# Patient Record
Sex: FEMALE | Race: BLACK OR AFRICAN AMERICAN | Employment: FULL TIME | URBAN - METROPOLITAN AREA
[De-identification: names, ages, dates, MRNs, and addresses within clinical notes are randomized per-mention and may not be internally consistent; named-entity substitution may affect disease eponyms.]

---

## 2017-07-11 ENCOUNTER — HOSPITAL ENCOUNTER (OUTPATIENT)
Dept: PHYSICAL THERAPY | Age: 67
Discharge: HOME | End: 2017-07-11
Payer: COMMERCIAL

## 2017-07-11 PROCEDURE — G8979 MOBILITY GOAL STATUS: HCPCS

## 2017-07-11 PROCEDURE — 97162 PT EVAL MOD COMPLEX 30 MIN: CPT

## 2017-07-11 PROCEDURE — G8978 MOBILITY CURRENT STATUS: HCPCS

## 2017-07-11 NOTE — PROGRESS NOTES
Cheryl Daly  : [facility] [address] [place] [address]  [phone]   [phone]        OUTPATIENT PHYSICAL THERAPY:Initial Assessment 2017    ICD-10: Treatment Diagnosis: Lumbar Pain- M54.5  Precautions/Allergies:   Codine, Latex   Fall Risk Score: 1 (? 5 = High Risk)  MD Orders: Eval and Treat MEDICAL/REFERRING DIAGNOSIS:  Low back pain [M54.5]   DATE OF ONSET: 3 weeks ago  REFERRING PHYSICIAN: Adelaida Zaragoza MD  RETURN PHYSICIAN APPOINTMENT: Not Reported     INITIAL ASSESSMENT:  Ms. Guru Mckeon presents with lumbar pain with facet joint irritation and associated muscle guarding. PROBLEM LIST (Impacting functional limitations):  1. Decreased Strength  2. Decreased ADL/Functional Activities  3. Decreased Ambulation Ability/Technique  4. Increased Pain  5. Decreased Activity Tolerance  6. Decreased Flexibility/Joint Mobility INTERVENTIONS PLANNED:  1. Heat  2. Home Exercise Program (HEP)  3. Manual Therapy  4. Neuromuscular Re-education/Strengthening  5. Range of Motion (ROM)  6. Therapeutic Exercise/Strengthening   TREATMENT PLAN:  Effective Dates: 17 TO 17. Frequency/Duration: 2 times a week for 8 weeks  GOALS: (Goals have been discussed and agreed upon with patient.)  SHORT-TERM FUNCTIONAL GOALS: Time Frame: 2017  1. The patient will be independent with a basic home exercise program(HEP) to address pain, ROM, and muscle function  2. The patient will show a decrease in pain level to <6/10. DISCHARGE GOALS: Time Frame: 2017  1. The patient will be independent with an advanced home exercise program(HEP) to address pain, ROM, and muscle function  2. The patient will show improvement of AROM to within functional limits to improve ability to bend, lift, perform work duties, and sleep normally.   3. The patient will show a return of lumbopelvic muscle strength to >4+/5 and improvements in muscle coordination, control, and conditioning for return to activities of bending, lifting, sleeping, prolonged sitting, prolonged walking, housework, and negotiating stairs. 4. The patient will show a decrease in pain level to <3/10 to improve ability to return to activities of bending, lifting, sleeping, prolonged sitting, prolonged walking, housework, and negotiating stairs. 5. The patient will improve Oswestry score to <10/50 to reflect an improvement in this patients self reported functional ability. Rehabilitation Potential For Stated Goals: Good  Regarding Rafael Leahjosé luisbeth [address] Bal[facility]'s therapy, I certify that the treatment plan above will be carried out by a therapist or under their direction. Thank you for this referral,  Isra Godwin , DPT, OCS, FAAOMPT, CSCS   Referring Physician Signature: Gary Smith MD              Date                    The information in this section was collected on 7/11/17 (except where otherwise noted). HISTORY:   History of Present Injury/Illness (Reason for Referral): The patient comes to physical therapy clinic reporting lumbar pain on R side and R anterior thigh pain and numbness. Started 3 weeks ago for no known reason with no known injury or trauma. History of fall with lumbar spasms 1990's with full recovery and significant lumbar pain since then. [place] of symptoms reported in lumbar as occasionally stabbing and dull ache. Symptoms are reported as getting better over the last 2 weeks. Average pain level reported as 8/10 and worst pain a 10/10. Aggravating factors include prolonged standing/walking, lifting/bending, laying on your back, negotiating stairs. Relieving factors include rest, sitting in a certain position, TENS, heat/ice. Imaging performed includes none at this point. Past Medical History/Comorbidities:   Ms. Gifty Moore  has HTN and patellar fracture with repair a few years ago, past cancer medical history on file.   Social History/Living Environment: Lives is a 2 story home with bed room upstairs  Prior Level of Function/Work/Activity:  Works as a  since [address],[facility] and has a mix of sitting and walking; wants to get back to dancing, bowling, shopping  Dominant Side:         RIGHT  Current Medications:     No current outpatient prescriptions on file. Date Last Reviewed:  7/11/2017    Number of Personal Factors/Comorbidities that affect the Plan of Care: 1-2: MODERATE COMPLEXITY   EXAMINATION:   Patient denies any increase of symptoms with cough, sneeze, or valsalva maneuver. Patient denies any saddle paresthesia or changes in bowel/bladder function. .............   Observation/Orthostatic:   In Standing    Thoracic Spine Slight kyphosis   Lumbar Spine Increased lordosis   Lumbar Soft Tissues Tight and guarded R   LE Positioning Decreased weight bearing on R LE   Gait Observation Guarded and slow gait   Palpation: tender, firm, guarded R paraspinals and quadratus lumborum   Lumbar Segmental Joint Mobility: unable to accurately test due to guarding  Range of Motion:    Lumbar AROM   Flexion 80% pulling lumbar   Extension 20% pain inc   R Rotation 60%   L Rotation 60%   R Sidebending 70%   L Sidebending 70%      Strength:   Lumbar    Flexion 4-/5   Extension 4   R Rotation 4-   L Rotation 4-   R Sidebending 4-   L Sidebending 4-   Lumbar Multifidus Activation    Transverse Abdominis(TrA) Activation       Special Tests:     Lumbar    Passive Straight Leg Raise (SLR) Negative for signs of HNP   Crossed SLR Negative     Neurologic Screen:    Myotomes Right  Left   Hip Flexion (L1-2) Normal Normal   Knee Extension (L3) Normal Normal   Ankle Dorsiflexion (L4) Normal Normal   Ankle Eversion or Great Toe Extension (L5) Normal Normal   Knee Flexion or Ankle Plantarflexion (S1) Normal Normal     Dermatomes Right  Left    Mid Thigh L1-2 Dull Normal   Medial Knee/Patella L3 Normal Normal   Medial Lower Leg/Foot L4 Normal Normal   Lateral Lower Leg/Foot L5 Normal Normal   Posterior Lower Leg S1 Normal Normal     Reflexes Right  Left   Patellar (L4) 2+ 2+   Achilles Tendon (S1-2) 2+ 2+     Neural Tension Tests Right  Left   Passive Straight Leg Raise (SLR) Abnormal neural tension Mild abnormal neural tension   Crossed SLR Negative Negative        Body Structures Involved:  1. Bones  2. Joints  3. Muscles  4. Ligaments Body Functions Affected:  1. Neuromusculoskeletal  2. Movement Related Activities and Participation Affected:  1. General Tasks and Demands  2. Mobility  3. Self Care  4. Domestic Life  5. Community, Social and [place]   Number of elements (examined above) that affect the Plan of Care: 4+: HIGH COMPLEXITY   CLINICAL PRESENTATION:   Presentation: Evolving clinical presentation with changing clinical characteristics: MODERATE COMPLEXITY   CLINICAL DECISION MAKING:   Outcome Measure: Tool Used: Modified Oswestry Low Back Pain Questionnaire  Score:  Initial: 31/50  Most Recent: X/50 (Date: -- )   Interpretation of Score: Each section is scored on a 0-5 scale, 5 representing the greatest disability. The scores of each section are added together for a total score of 50. Score 0 1-10 11-20 21-30 31-40 41-49 50   Modifier CH CI CJ CK CL CM CN     ? Mobility - Walking and Moving Around:     - CURRENT STATUS: CL - 60%-79% impaired, limited or restricted    - GOAL STATUS: CI - 1%-19% impaired, limited or restricted    - D/C STATUS:  ---------------To be determined---------------      Medical Necessity:   · Patient demonstrates good rehab potential due to higher previous functional level. Reason for Services/Other Comments:  · Patient continues to require skilled intervention due to continued functional limitations due to continued impairments.    Use of outcome tool(s) and clinical judgement create a POC that gives a: Questionable prediction of patient's progress: MODERATE COMPLEXITY            TREATMENT:   (In addition to Assessment/Re-Assessment sessions the following treatments were rendered)  Pre-treatment Symptoms/Complaints:  Dull lumbar pain  Pain: Initial: Pain Intensity 1: 8/10 Post Session:  7/10     Therapeutic Exercise: ( ):  Exercises per grid below to improve mobility, strength and coordination. Required minimal visual and verbal cues to promote proper body posture and promote proper body mechanics. Progressed exercises as indicated. **Allergic to Latex** Date:  7/11/17   Activity/Exercise Parameters   Lower Trunk Rotation (LTR) 3min HEP   Posterior Pelvic Tilt (PPT) 3min HEP                          Manual Therapy (     ): Manual techniques to facilitate improved motion and decreased pain. · None today      Treatment/Session Assessment:    · Response to Treatment:  No irritation of any symptoms. · Compliance with Program/Exercises: Will assess as treatment progresses. · Recommendations/Intent for next treatment session: \"Next visit will focus on advancements to more challenging activities\".   Total Treatment Duration:  PT Patient Time In/Time Out  Time In: 1630  Time Out: [place] , DPT, OCS, FAAOMPT, CSCS

## 2017-07-12 NOTE — PROGRESS NOTES
Ambulatory/Rehab Services H2 Model Falls Risk Assessment    Risk Factor Pts. ·   Confusion/Disorientation/Impulsivity  []    4 ·   Symptomatic Depression  []   2 ·   Altered Elimination  []   1 ·   Dizziness/Vertigo  []   1 ·   Gender (Male)  []   1 ·   Any administered antiepileptics (anticonvulsants):  []   2 ·   Any administered benzodiazepines:  []   1 ·   Visual Impairment (specify):  []   1 ·   Portable Oxygen Use  []   1 ·   Orthostatic ? BP  []   1 ·   History of Recent Falls (within 3 mos.)  []   5     Ability to Rise from Chair (choose one) Pts. ·   Ability to rise in a single movement  []   0 ·   Pushes up, successful in one attempt  [x]   1 ·   Multiple attempts, but successful  []   3 ·   Unable to rise without assistance  []   4   Total: (5 or greater = High Risk) 1     Falls Prevention Plan:   []                Physical Limitations to Exercise (specify):   []                Mobility Assistance Device (type):   []                Exercise/Equipment Adaptation (specify):    ©2010 [facility] [address] Patent #[id number].  Federal Law prohibits the replication, distribution or use without written permission from [facility] [employer]

## 2017-07-18 ENCOUNTER — HOSPITAL ENCOUNTER (OUTPATIENT)
Dept: PHYSICAL THERAPY | Age: 67
Discharge: HOME | End: 2017-07-18
Payer: COMMERCIAL

## 2017-07-18 PROCEDURE — 97140 MANUAL THERAPY 1/> REGIONS: CPT

## 2017-07-18 PROCEDURE — 97110 THERAPEUTIC EXERCISES: CPT

## 2017-07-18 NOTE — PROGRESS NOTES
Ирина Michel  : 1950 [facility] at [address] [place] [address]  [phone]   [phone]        OUTPATIENT PHYSICAL THERAPY:Daily Note 2017    ICD-10: Treatment Diagnosis: Lumbar Pain- M54.5  Precautions/Allergies:   Codine, Latex   Fall Risk Score: 1 (? 5 = High Risk)  MD Orders: Eval and Treat MEDICAL/REFERRING DIAGNOSIS:  Low back pain [M54.5]   DATE OF ONSET: 3 weeks ago  REFERRING PHYSICIAN: Alexandru Mcwilliams MD  RETURN PHYSICIAN APPOINTMENT: Not Reported     INITIAL ASSESSMENT:  Ms. Valente Dc presents with lumbar pain with facet joint irritation and associated muscle guarding. PROBLEM LIST (Impacting functional limitations):  1. Decreased Strength  2. Decreased ADL/Functional Activities  3. Decreased Ambulation Ability/Technique  4. Increased Pain  5. Decreased Activity Tolerance  6. Decreased Flexibility/Joint Mobility INTERVENTIONS PLANNED:  1. Heat  2. Home Exercise Program (HEP)  3. Manual Therapy  4. Neuromuscular Re-education/Strengthening  5. Range of Motion (ROM)  6. Therapeutic Exercise/Strengthening   TREATMENT PLAN:  Effective Dates: 17 TO 17. Frequency/Duration: 2 times a week for 8 weeks  GOALS: (Goals have been discussed and agreed upon with patient.)  SHORT-TERM FUNCTIONAL GOALS: Time Frame: 2017  1. The patient will be independent with a basic home exercise program(HEP) to address pain, ROM, and muscle function  2. The patient will show a decrease in pain level to <6/10. DISCHARGE GOALS: Time Frame: 2017  1. The patient will be independent with an advanced home exercise program(HEP) to address pain, ROM, and muscle function  2. The patient will show improvement of AROM to within functional limits to improve ability to bend, lift, perform work duties, and sleep normally.   3. The patient will show a return of lumbopelvic muscle strength to >4+/5 and improvements in muscle coordination, control, and conditioning for return to activities of bending, lifting, sleeping, prolonged sitting, prolonged walking, housework, and negotiating stairs. 4. The patient will show a decrease in pain level to <3/10 to improve ability to return to activities of bending, lifting, sleeping, prolonged sitting, prolonged walking, housework, and negotiating stairs. 5. The patient will improve Oswestry score to <10/50 to reflect an improvement in this patients self reported functional ability. Rehabilitation Potential For Stated Goals: Good  Regarding Rafael Leahjosé luisbeth [address] Bal[facility]'s therapy, I certify that the treatment plan above will be carried out by a therapist or under their direction. Thank you for this referral,  Matty Trimble , DPT, OCS, FAAOMPT, CSCS   Referring Physician Signature: Monico Feldman MD              Date                    The information in this section was collected on 7/11/17 (except where otherwise noted). HISTORY:   History of Present Injury/Illness (Reason for Referral): The patient comes to physical therapy clinic reporting lumbar pain on R side and R anterior thigh pain and numbness. Started 3 weeks ago for no known reason with no known injury or trauma. History of fall with lumbar spasms 1990's with full recovery and significant lumbar pain since then. [place] of symptoms reported in lumbar as occasionally stabbing and dull ache. Symptoms are reported as getting better over the last 2 weeks. Average pain level reported as 8/10 and worst pain a 10/10. Aggravating factors include prolonged standing/walking, lifting/bending, laying on your back, negotiating stairs. Relieving factors include rest, sitting in a certain position, TENS, heat/ice. Imaging performed includes none at this point. Past Medical History/Comorbidities:   Ms. Shikha Sutherland  has HTN and patellar fracture with repair a few years ago, past cancer medical history on file.   Social History/Living Environment:     Lives is a 2 story home with bed room upstairs  Prior Level of Function/Work/Activity:  Works as a  since [address],[facility] and has a mix of sitting and walking; wants to get back to dancing, bowling, shopping  Dominant Side:         RIGHT  Current Medications:     No current outpatient prescriptions on file. Date Last Reviewed:  7/18/2017    Number of Personal Factors/Comorbidities that affect the Plan of Care: 1-2: MODERATE COMPLEXITY   EXAMINATION:   Patient denies any increase of symptoms with cough, sneeze, or valsalva maneuver. Patient denies any saddle paresthesia or changes in bowel/bladder function. .............   Observation/Orthostatic:   In Standing    Thoracic Spine Slight kyphosis   Lumbar Spine Increased lordosis   Lumbar Soft Tissues Tight and guarded R   LE Positioning Decreased weight bearing on R LE   Gait Observation Guarded and slow gait   Palpation: tender, firm, guarded R paraspinals and quadratus lumborum   Lumbar Segmental Joint Mobility: unable to accurately test due to guarding  Range of Motion:    Lumbar AROM   Flexion 80% pulling lumbar   Extension 20% pain inc   R Rotation 60%   L Rotation 60%   R Sidebending 70%   L Sidebending 70%      Strength:   Lumbar    Flexion 4-/5   Extension 4   R Rotation 4-   L Rotation 4-   R Sidebending 4-   L Sidebending 4-   Lumbar Multifidus Activation    Transverse Abdominis(TrA) Activation       Special Tests:     Lumbar    Passive Straight Leg Raise (SLR) Negative for signs of HNP   Crossed SLR Negative     Neurologic Screen:    Myotomes Right  Left   Hip Flexion (L1-2) Normal Normal   Knee Extension (L3) Normal Normal   Ankle Dorsiflexion (L4) Normal Normal   Ankle Eversion or Great Toe Extension (L5) Normal Normal   Knee Flexion or Ankle Plantarflexion (S1) Normal Normal     Dermatomes Right  Left    Mid Thigh L1-2 Dull Normal   Medial Knee/Patella L3 Normal Normal   Medial Lower Leg/Foot L4 Normal Normal   Lateral Lower Leg/Foot L5 Normal Normal Posterior Lower Leg S1 Normal Normal     Reflexes Right  Left   Patellar (L4) 2+ 2+   Achilles Tendon (S1-2) 2+ 2+     Neural Tension Tests Right  Left   Passive Straight Leg Raise (SLR) Abnormal neural tension Mild abnormal neural tension   Crossed SLR Negative Negative        Body Structures Involved:  1. Bones  2. Joints  3. Muscles  4. Ligaments Body Functions Affected:  1. Neuromusculoskeletal  2. Movement Related Activities and Participation Affected:  1. General Tasks and Demands  2. Mobility  3. Self Care  4. Domestic Life  5. Community, Social and [employer]   Number of elements (examined above) that affect the Plan of Care: 4+: HIGH COMPLEXITY   CLINICAL PRESENTATION:   Presentation: Evolving clinical presentation with changing clinical characteristics: MODERATE COMPLEXITY   CLINICAL DECISION MAKING:   Outcome Measure: Tool Used: Modified Oswestry Low Back Pain Questionnaire  Score:  Initial: 31/50  Most Recent: X/50 (Date: -- )   Interpretation of Score: Each section is scored on a 0-5 scale, 5 representing the greatest disability. The scores of each section are added together for a total score of 50. Score 0 1-10 11-20 21-30 31-40 41-49 50   Modifier CH CI CJ CK CL CM CN     ? Mobility - Walking and Moving Around:     - CURRENT STATUS: CL - 60%-79% impaired, limited or restricted    - GOAL STATUS: CI - 1%-19% impaired, limited or restricted    - D/C STATUS:  ---------------To be determined---------------    Medical Necessity:   · Patient demonstrates good rehab potential due to higher previous functional level. Reason for Services/Other Comments:  · Patient continues to require skilled intervention due to continued functional limitations due to continued impairments.    Use of outcome tool(s) and clinical judgement create a POC that gives a: Questionable prediction of patient's progress: MODERATE COMPLEXITY            TREATMENT:   (In addition to Assessment/Re-Assessment sessions the following treatments were rendered)  Pre-treatment Symptoms/Complaints:  (7/18/2017)  Pt reports she feels like her pain has been better since last time. Pain: Initial: Pain Intensity 1: 6/10 Post Session:  5/10     Therapeutic Exercise: (30 Minutes):  Exercises per grid below to improve mobility, strength and coordination. Required minimal visual and verbal cues to promote proper body posture and promote proper body mechanics. Progressed exercises as indicated. **Allergic to Latex** Date:  7/18/2017   Activity/Exercise Parameters   Lower Trunk Rotation (LTR) 3min HEP   Posterior Pelvic Tilt (PPT) 3min HEP   Sidelying Thoracic/Lumbar Rotation 3x20   Supine BKTC with swiss ball 2x20                  Manual Therapy (    Soft Tissue Mobilization Duration  Duration: 15 Minutes): Manual techniques to facilitate improved motion and decreased pain. · Soft Tissue Mobilization (STM)- R paraspinals and R quadratus lumborum  · Supine Hip Axial Distraction Oscillations for lumbar muscle relaxation    Treatment/Session Assessment:  (7/18/2017)  Pt shows significant progress with reduction in lumbar muscle guarding and tension. · Response to Treatment:  No irritation of any symptoms. · Compliance with Program/Exercises: Will assess as treatment progresses. · Recommendations/Intent for next treatment session: \"Next visit will focus on advancements to more challenging activities\".   Total Treatment Duration:  PT Patient Time In/Time Out  Time In: 1630  Time Out: [place] , DPT, OCS, FAAOMPT, CSCS

## 2017-08-01 ENCOUNTER — APPOINTMENT (OUTPATIENT)
Dept: PHYSICAL THERAPY | Age: 67
End: 2017-08-01
Payer: COMMERCIAL

## 2017-08-01 ENCOUNTER — HOSPITAL ENCOUNTER (OUTPATIENT)
Dept: PHYSICAL THERAPY | Age: 67
Discharge: HOME | End: 2017-08-01
Payer: COMMERCIAL

## 2017-08-01 PROCEDURE — 97140 MANUAL THERAPY 1/> REGIONS: CPT

## 2017-08-01 PROCEDURE — 97110 THERAPEUTIC EXERCISES: CPT

## 2017-08-03 ENCOUNTER — HOSPITAL ENCOUNTER (OUTPATIENT)
Dept: PHYSICAL THERAPY | Age: 67
End: 2017-08-03
Payer: COMMERCIAL

## 2017-08-03 ENCOUNTER — APPOINTMENT (OUTPATIENT)
Dept: PHYSICAL THERAPY | Age: 67
End: 2017-08-03
Payer: COMMERCIAL

## 2017-08-08 ENCOUNTER — HOSPITAL ENCOUNTER (OUTPATIENT)
Dept: PHYSICAL THERAPY | Age: 67
Discharge: HOME | End: 2017-08-08
Payer: COMMERCIAL

## 2017-08-08 ENCOUNTER — APPOINTMENT (OUTPATIENT)
Dept: PHYSICAL THERAPY | Age: 67
End: 2017-08-08
Payer: COMMERCIAL

## 2017-08-08 PROCEDURE — 97140 MANUAL THERAPY 1/> REGIONS: CPT

## 2017-08-08 PROCEDURE — 97110 THERAPEUTIC EXERCISES: CPT

## 2017-08-08 NOTE — PROGRESS NOTES
Jessica Macias  : [facility] at [address] [place] [address]  [phone]   [phone]        OUTPATIENT PHYSICAL THERAPY:Daily Note 2017    ICD-10: Treatment Diagnosis: Lumbar Pain- M54.5  Precautions/Allergies:   Codine, Latex   Fall Risk Score: 1 (? 5 = High Risk)  MD Orders: Eval and Treat MEDICAL/REFERRING DIAGNOSIS:  Low back pain [M54.5]   DATE OF ONSET: 3 weeks ago  REFERRING PHYSICIAN: Giancarlo Prado MD  RETURN PHYSICIAN APPOINTMENT: Not Reported     INITIAL ASSESSMENT:  Ms. Victorino Sherman presents with lumbar pain with facet joint irritation and associated muscle guarding. PROBLEM LIST (Impacting functional limitations):  1. Decreased Strength  2. Decreased ADL/Functional Activities  3. Decreased Ambulation Ability/Technique  4. Increased Pain  5. Decreased Activity Tolerance  6. Decreased Flexibility/Joint Mobility INTERVENTIONS PLANNED:  1. Heat  2. Home Exercise Program (HEP)  3. Manual Therapy  4. Neuromuscular Re-education/Strengthening  5. Range of Motion (ROM)  6. Therapeutic Exercise/Strengthening   TREATMENT PLAN:  Effective Dates: 17 TO 17. Frequency/Duration: 2 times a week for 8 weeks  GOALS: (Goals have been discussed and agreed upon with patient.)  SHORT-TERM FUNCTIONAL GOALS: Time Frame: 2017  1. The patient will be independent with a basic home exercise program(HEP) to address pain, ROM, and muscle function  2. The patient will show a decrease in pain level to <6/10. DISCHARGE GOALS: Time Frame: 2017  1. The patient will be independent with an advanced home exercise program(HEP) to address pain, ROM, and muscle function  2. The patient will show improvement of AROM to within functional limits to improve ability to bend, lift, perform work duties, and sleep normally.   3. The patient will show a return of lumbopelvic muscle strength to >4+/5 and improvements in muscle coordination, control, and conditioning for return to activities of bending, lifting, sleeping, prolonged sitting, prolonged walking, housework, and negotiating stairs. 4. The patient will show a decrease in pain level to <3/10 to improve ability to return to activities of bending, lifting, sleeping, prolonged sitting, prolonged walking, housework, and negotiating stairs. 5. The patient will improve Oswestry score to <10/50 to reflect an improvement in this patients self reported functional ability. Rehabilitation Potential For Stated Goals: Good              The information in this section was collected on 7/11/17 (except where otherwise noted). HISTORY:   History of Present Injury/Illness (Reason for Referral): The patient comes to physical therapy clinic reporting lumbar pain on R side and R anterior thigh pain and numbness. Started 3 weeks ago for no known reason with no known injury or trauma. History of fall with lumbar spasms 1990's with full recovery and significant lumbar pain since then. Syed Gills of symptoms reported in lumbar as occasionally stabbing and dull ache. Symptoms are reported as getting better over the last 2 weeks. Average pain level reported as 8/10 and worst pain a 10/10. Aggravating factors include prolonged standing/walking, lifting/bending, laying on your back, negotiating stairs. Relieving factors include rest, sitting in a certain position, TENS, heat/ice. Imaging performed includes none at this point. Past Medical History/Comorbidities:   Ms. Lucien Lundberg  has HTN and patellar fracture with repair a few years ago, past cancer medical history on file.   Social History/Living Environment:     Lives is a 2 story home with bed room upstairs  Prior Level of Function/Work/Activity:  Works as a  since 17 and has a mix of sitting and walking; wants to get back to dancing, bowling, shopping  Dominant Side:         RIGHT  Current Medications:     No current outpatient prescriptions on file. Date Last Reviewed:  8/8/2017    Number of Personal Factors/Comorbidities that affect the Plan of Care: 1-2: MODERATE COMPLEXITY   EXAMINATION:   Patient denies any increase of symptoms with cough, sneeze, or valsalva maneuver. Patient denies any saddle paresthesia or changes in bowel/bladder function. .............   Observation/Orthostatic:   In Standing    Thoracic Spine Slight kyphosis   Lumbar Spine Increased lordosis   Lumbar Soft Tissues Tight and guarded R   LE Positioning Decreased weight bearing on R LE   Gait Observation Guarded and slow gait   Palpation: tender, firm, guarded R paraspinals and quadratus lumborum   Lumbar Segmental Joint Mobility: unable to accurately test due to guarding  Range of Motion:    Lumbar AROM   Flexion 80% pulling lumbar   Extension 20% pain inc   R Rotation 60%   L Rotation 60%   R Sidebending 70%   L Sidebending 70%      Strength:   Lumbar    Flexion 4-/5   Extension 4   R Rotation 4-   L Rotation 4-   R Sidebending 4-   L Sidebending 4-   Lumbar Multifidus Activation    Transverse Abdominis(TrA) Activation       Special Tests:     Lumbar    Passive Straight Leg Raise (SLR) Negative for signs of HNP   Crossed SLR Negative     Neurologic Screen:    Myotomes Right  Left   Hip Flexion (L1-2) Normal Normal   Knee Extension (L3) Normal Normal   Ankle Dorsiflexion (L4) Normal Normal   Ankle Eversion or Great Toe Extension (L5) Normal Normal   Knee Flexion or Ankle Plantarflexion (S1) Normal Normal     Dermatomes Right  Left    Mid Thigh L1-2 Dull Normal   Medial Knee/Patella L3 Normal Normal   Medial Lower Leg/Foot L4 Normal Normal   Lateral Lower Leg/Foot L5 Normal Normal   Posterior Lower Leg S1 Normal Normal     Reflexes Right  Left   Patellar (L4) 2+ 2+   Achilles Tendon (S1-2) 2+ 2+     Neural Tension Tests Right  Left   Passive Straight Leg Raise (SLR) Abnormal neural tension Mild abnormal neural tension   Crossed SLR Negative Negative        Body Structures Involved:  1. Bones  2. Joints  3. Muscles  4. Ligaments Body Functions Affected:  1. Neuromusculoskeletal  2. Movement Related Activities and Participation Affected:  1. General Tasks and Demands  2. Mobility  3. Self Care  4. Domestic Life  5. Community, Social and [place]   Number of elements (examined above) that affect the Plan of Care: 4+: HIGH COMPLEXITY   CLINICAL PRESENTATION:   Presentation: Evolving clinical presentation with changing clinical characteristics: MODERATE COMPLEXITY   CLINICAL DECISION MAKING:   Outcome Measure: Tool Used: Modified Oswestry Low Back Pain Questionnaire  Score:  Initial: 31/50  Most Recent: X/50 (Date: -- )   Interpretation of Score: Each section is scored on a 0-5 scale, 5 representing the greatest disability. The scores of each section are added together for a total score of 50. Score 0 1-10 11-20 21-30 31-40 41-49 50   Modifier CH CI CJ CK CL CM CN     ? Mobility - Walking and Moving Around:     - CURRENT STATUS: CL - 60%-79% impaired, limited or restricted    - GOAL STATUS: CI - 1%-19% impaired, limited or restricted    - D/C STATUS:  ---------------To be determined---------------    Medical Necessity:   · Patient demonstrates good rehab potential due to higher previous functional level. Reason for Services/Other Comments:  · Patient continues to require skilled intervention due to continued functional limitations due to continued impairments. Use of outcome tool(s) and clinical judgement create a POC that gives a: Questionable prediction of patient's progress: MODERATE COMPLEXITY            TREATMENT:   (In addition to Assessment/Re-Assessment sessions the following treatments were rendered)  Pre-treatment Symptoms/Complaints:  Patient says back is less sore today but feels stiff along her right side.      Pain: Initial: Pain Intensity 1: 3/10 Post Session: 1/10     Therapeutic Exercise: (30 Minutes):  Exercises per grid below to improve mobility, strength and coordination. Required minimal visual and verbal cues to promote proper body posture and promote proper body mechanics. Progressed exercises as indicated. **Allergic to Latex** Date:  8/1/17 Date:  8/8/17   Activity/Exercise Parameters Parameters   Lower Trunk Rotation (LTR) 3 min 2 min   Posterior Pelvic Tilt (PPT) 3 min 2min   Sidelying Thoracic/Lumbar Rotation 3 x 10 2 x 10   Supine BKTC with swiss ball -- --   Hip isometrics Belt, ball, 3 x 10 each Red, x 2 min, pillow x 2 min   Glut set 10s x 10 each x10   KTC -- SL and DL x 5 each   Self distraction -- Gray band x 5 min      Manual Therapy (    Soft Tissue Mobilization Duration  Duration: 15 Minutes): Manual techniques to facilitate improved motion and decreased pain. · Soft Tissue Mobilization (STM)- R paraspinals and R quadratus lumborum  · Supine Hip Axial Distraction Oscillations for lumbar muscle relaxation    Treatment/Session Assessment:  Patient reports less stiffness and pain and improved ability to ambulate with less pain following long axis hip distraction today. · Response to Treatment:  No irritation of any symptoms. · Compliance with Program/Exercises: Will assess as treatment progresses. · Recommendations/Intent for next treatment session: \"Next visit will focus on advancements to more challenging activities\". Total Treatment Duration: 45 minutes  PT Patient Time In/Time Out  Time In: 1630  Time Out: Capri Schwartz

## 2017-08-10 ENCOUNTER — APPOINTMENT (OUTPATIENT)
Dept: PHYSICAL THERAPY | Age: 67
End: 2017-08-10
Payer: COMMERCIAL

## 2017-08-15 ENCOUNTER — APPOINTMENT (OUTPATIENT)
Dept: PHYSICAL THERAPY | Age: 67
End: 2017-08-15
Payer: COMMERCIAL

## 2017-08-15 ENCOUNTER — HOSPITAL ENCOUNTER (OUTPATIENT)
Dept: PHYSICAL THERAPY | Age: 67
Discharge: HOME | End: 2017-08-15
Payer: COMMERCIAL

## 2017-08-16 NOTE — PROGRESS NOTES
[facility] at [address] [place] [address], Oracio Beth   [phone]   [phone]    DATE: 8/15/2017    Patient cancelled appointment today due to not feeling well. Will plan to follow up on next scheduled visit.       Yoselyn Bhat, PT, DPT

## 2017-08-17 ENCOUNTER — APPOINTMENT (OUTPATIENT)
Dept: PHYSICAL THERAPY | Age: 67
End: 2017-08-17
Payer: COMMERCIAL

## 2017-08-17 ENCOUNTER — HOSPITAL ENCOUNTER (OUTPATIENT)
Dept: PHYSICAL THERAPY | Age: 67
Discharge: HOME | End: 2017-08-17
Payer: COMMERCIAL

## 2017-08-17 PROCEDURE — 97110 THERAPEUTIC EXERCISES: CPT

## 2017-08-17 NOTE — PROGRESS NOTES
Valente Crouch  : [facility] at [address] [place] [address]  [phone]   [phone]        OUTPATIENT PHYSICAL THERAPY:Daily Note 2017    ICD-10: Treatment Diagnosis: Lumbar Pain- M54.5  Precautions/Allergies:   Codine, Latex   Fall Risk Score: 1 (? 5 = High Risk)  MD Orders: Eval and Treat MEDICAL/REFERRING DIAGNOSIS:  Low back pain [M54.5]   DATE OF ONSET: 3 weeks ago  REFERRING PHYSICIAN: Luz Mcclain MD  RETURN PHYSICIAN APPOINTMENT: Not Reported     INITIAL ASSESSMENT:  Ms. Jaylene Lucas presents with lumbar pain with facet joint irritation and associated muscle guarding. PROBLEM LIST (Impacting functional limitations):  1. Decreased Strength  2. Decreased ADL/Functional Activities  3. Decreased Ambulation Ability/Technique  4. Increased Pain  5. Decreased Activity Tolerance  6. Decreased Flexibility/Joint Mobility INTERVENTIONS PLANNED:  1. Heat  2. Home Exercise Program (HEP)  3. Manual Therapy  4. Neuromuscular Re-education/Strengthening  5. Range of Motion (ROM)  6. Therapeutic Exercise/Strengthening   TREATMENT PLAN:  Effective Dates: 17 TO 17. Frequency/Duration: 2 times a week for 8 weeks  GOALS: (Goals have been discussed and agreed upon with patient.)  SHORT-TERM FUNCTIONAL GOALS: Time Frame: 2017  1. The patient will be independent with a basic home exercise program(HEP) to address pain, ROM, and muscle function  2. The patient will show a decrease in pain level to <6/10. DISCHARGE GOALS: Time Frame: 2017  1. The patient will be independent with an advanced home exercise program(HEP) to address pain, ROM, and muscle function  2. The patient will show improvement of AROM to within functional limits to improve ability to bend, lift, perform work duties, and sleep normally.   3. The patient will show a return of lumbopelvic muscle strength to >4+/5 and improvements in muscle coordination, control, and conditioning for return to activities of bending, lifting, sleeping, prolonged sitting, prolonged walking, housework, and negotiating stairs. 4. The patient will show a decrease in pain level to <3/10 to improve ability to return to activities of bending, lifting, sleeping, prolonged sitting, prolonged walking, housework, and negotiating stairs. 5. The patient will improve Oswestry score to <10/50 to reflect an improvement in this patients self reported functional ability. Rehabilitation Potential For Stated Goals: Good              The information in this section was collected on 7/11/17 (except where otherwise noted). HISTORY:   History of Present Injury/Illness (Reason for Referral): The patient comes to physical therapy clinic reporting lumbar pain on R side and R anterior thigh pain and numbness. Started 3 weeks ago for no known reason with no known injury or trauma. History of fall with lumbar spasms 1990's with full recovery and significant lumbar pain since then. Ede Becerril of symptoms reported in lumbar as occasionally stabbing and dull ache. Symptoms are reported as getting better over the last 2 weeks. Average pain level reported as 8/10 and worst pain a 10/10. Aggravating factors include prolonged standing/walking, lifting/bending, laying on your back, negotiating stairs. Relieving factors include rest, sitting in a certain position, TENS, heat/ice. Imaging performed includes none at this point. Past Medical History/Comorbidities:   Ms. Diana Vasques  has HTN and patellar fracture with repair a few years ago, past cancer medical history on file.   Social History/Living Environment:     Lives is a 2 story home with bed room upstairs  Prior Level of Function/Work/Activity:  Works as a  since [address] [place] [address] and has a mix of sitting and walking; wants to get back to dancing, bowling, shopping  Dominant Side:         RIGHT  Current Medications:     No current outpatient prescriptions on file. Date Last Reviewed:  8/17/2017    Number of Personal Factors/Comorbidities that affect the Plan of Care: 1-2: MODERATE COMPLEXITY   EXAMINATION:   Patient denies any increase of symptoms with cough, sneeze, or valsalva maneuver. Patient denies any saddle paresthesia or changes in bowel/bladder function. .............   Observation/Orthostatic:   In Standing    Thoracic Spine Slight kyphosis   Lumbar Spine Increased lordosis   Lumbar Soft Tissues Tight and guarded R   LE Positioning Decreased weight bearing on R LE   Gait Observation Guarded and slow gait   Palpation: tender, firm, guarded R paraspinals and quadratus lumborum   Lumbar Segmental Joint Mobility: unable to accurately test due to guarding  Range of Motion:    Lumbar AROM   Flexion 80% pulling lumbar   Extension 20% pain inc   R Rotation 60%   L Rotation 60%   R Sidebending 70%   L Sidebending 70%      Strength:   Lumbar    Flexion 4-/5   Extension 4   R Rotation 4-   L Rotation 4-   R Sidebending 4-   L Sidebending 4-   Lumbar Multifidus Activation    Transverse Abdominis(TrA) Activation       Special Tests:     Lumbar    Passive Straight Leg Raise (SLR) Negative for signs of HNP   Crossed SLR Negative     Neurologic Screen:    Myotomes Right  Left   Hip Flexion (L1-2) Normal Normal   Knee Extension (L3) Normal Normal   Ankle Dorsiflexion (L4) Normal Normal   Ankle Eversion or Great Toe Extension (L5) Normal Normal   Knee Flexion or Ankle Plantarflexion (S1) Normal Normal     Dermatomes Right  Left    Mid Thigh L1-2 Dull Normal   Medial Knee/Patella L3 Normal Normal   Medial Lower Leg/Foot L4 Normal Normal   Lateral Lower Leg/Foot L5 Normal Normal   Posterior Lower Leg S1 Normal Normal     Reflexes Right  Left   Patellar (L4) 2+ 2+   Achilles Tendon (S1-2) 2+ 2+     Neural Tension Tests Right  Left   Passive Straight Leg Raise (SLR) Abnormal neural tension Mild abnormal neural tension   Crossed SLR Negative Negative        Body Structures Involved:  1. Bones  2. Joints  3. Muscles  4. Ligaments Body Functions Affected:  1. Neuromusculoskeletal  2. Movement Related Activities and Participation Affected:  1. General Tasks and Demands  2. Mobility  3. Self Care  4. Domestic Life  5. Community, Social and [place]   Number of elements (examined above) that affect the Plan of Care: 4+: HIGH COMPLEXITY   CLINICAL PRESENTATION:   Presentation: Evolving clinical presentation with changing clinical characteristics: MODERATE COMPLEXITY   CLINICAL DECISION MAKING:   Outcome Measure: Tool Used: Modified Oswestry Low Back Pain Questionnaire  Score:  Initial: 31/50  Most Recent: X/50 (Date: -- )   Interpretation of Score: Each section is scored on a 0-5 scale, 5 representing the greatest disability. The scores of each section are added together for a total score of 50. Score 0 1-10 11-20 21-30 31-40 41-49 50   Modifier CH CI CJ CK CL CM CN     ? Mobility - Walking and Moving Around:     - CURRENT STATUS: CL - 60%-79% impaired, limited or restricted    - GOAL STATUS: CI - 1%-19% impaired, limited or restricted    - D/C STATUS:  ---------------To be determined---------------    Medical Necessity:   · Patient demonstrates good rehab potential due to higher previous functional level. Reason for Services/Other Comments:  · Patient continues to require skilled intervention due to continued functional limitations due to continued impairments. Use of outcome tool(s) and clinical judgement create a POC that gives a: Questionable prediction of patient's progress: MODERATE COMPLEXITY            TREATMENT:   (In addition to Assessment/Re-Assessment sessions the following treatments were rendered)  Pre-treatment Symptoms/Complaints:  Patient says she is tired today. Pain: Initial: Pain Intensity 1: 2/10 Post Session: 1/10     Therapeutic Exercise: (30 Minutes):  Exercises per grid below to improve mobility, strength and coordination.   Required minimal visual and verbal cues to promote proper body posture and promote proper body mechanics. Progressed exercises as indicated. **Allergic to Latex** Date:  8/8/17 Date:  8/17/17   Activity/Exercise Parameters Parameters   Lower Trunk Rotation (LTR) 2 min 2 min   Posterior Pelvic Tilt (PPT) 2min 2 min   Sidelying Thoracic/Lumbar Rotation 2 x 10 2 x 10   Supine BKTC with swiss ball -- --   Hip isometrics Red, x 2 min, pillow x 2 min --   Glut set x10 --   KTC SL and DL x 5 each X 10 each   Self distraction Gray band x 5 min --   Cat camel -- 2 x 10   Hand heel rocks -- x10   Child pose -- 30s   Cable step out -- 2 x 10      Manual Therapy (     ): Manual techniques to facilitate improved motion and decreased pain. · Soft Tissue Mobilization (STM)- R paraspinals and R quadratus lumborum  · Supine Hip Axial Distraction Oscillations for lumbar muscle relaxation    Treatment/Session Assessment:  Patient continues to report less low back pain with repeated motions and stretching. Shortened session due to patient having schedule conflict. · Response to Treatment:  No irritation of any symptoms. · Compliance with Program/Exercises: Will assess as treatment progresses. · Recommendations/Intent for next treatment session: \"Next visit will focus on advancements to more challenging activities\". Total Treatment Duration: 30 minutes  PT Patient Time In/Time Out  Time In: 1630  Time Out: [facility]

## 2017-08-22 ENCOUNTER — HOSPITAL ENCOUNTER (OUTPATIENT)
Dept: PHYSICAL THERAPY | Age: 67
Discharge: HOME | End: 2017-08-22
Payer: COMMERCIAL

## 2017-08-22 PROCEDURE — 97140 MANUAL THERAPY 1/> REGIONS: CPT

## 2017-08-22 PROCEDURE — 97110 THERAPEUTIC EXERCISES: CPT

## 2017-08-22 NOTE — PROGRESS NOTES
Jyothi Crespo  : [facility] [address] [place] [address]  [phone]   [phone]        OUTPATIENT PHYSICAL THERAPY:Daily Note 2017    ICD-10: Treatment Diagnosis: Lumbar Pain- M54.5  Precautions/Allergies:   Codine, Latex   Fall Risk Score: 1 (? 5 = High Risk)  MD Orders: Eval and Treat MEDICAL/REFERRING DIAGNOSIS:  Low back pain [M54.5]   DATE OF ONSET: 3 weeks ago  REFERRING PHYSICIAN: Kraig Hoang MD  RETURN PHYSICIAN APPOINTMENT: Not Reported     INITIAL ASSESSMENT:  Ms. Terrell Castelan presents with lumbar pain with facet joint irritation and associated muscle guarding. PROBLEM LIST (Impacting functional limitations):  1. Decreased Strength  2. Decreased ADL/Functional Activities  3. Decreased Ambulation Ability/Technique  4. Increased Pain  5. Decreased Activity Tolerance  6. Decreased Flexibility/Joint Mobility INTERVENTIONS PLANNED:  1. Heat  2. Home Exercise Program (HEP)  3. Manual Therapy  4. Neuromuscular Re-education/Strengthening  5. Range of Motion (ROM)  6. Therapeutic Exercise/Strengthening   TREATMENT PLAN:  Effective Dates: 17 TO 17. Frequency/Duration: 2 times a week for 8 weeks  GOALS: (Goals have been discussed and agreed upon with patient.)  SHORT-TERM FUNCTIONAL GOALS: Time Frame: 2017  1. The patient will be independent with a basic home exercise program(HEP) to address pain, ROM, and muscle function  2. The patient will show a decrease in pain level to <6/10. DISCHARGE GOALS: Time Frame: 2017  1. The patient will be independent with an advanced home exercise program(HEP) to address pain, ROM, and muscle function  2. The patient will show improvement of AROM to within functional limits to improve ability to bend, lift, perform work duties, and sleep normally.   3. The patient will show a return of lumbopelvic muscle strength to >4+/5 and improvements in muscle coordination, control, and conditioning for return to activities of bending, lifting, sleeping, prolonged sitting, prolonged walking, housework, and negotiating stairs. 4. The patient will show a decrease in pain level to <3/10 to improve ability to return to activities of bending, lifting, sleeping, prolonged sitting, prolonged walking, housework, and negotiating stairs. 5. The patient will improve Oswestry score to <10/50 to reflect an improvement in this patients self reported functional ability. Rehabilitation Potential For Stated Goals: Good              The information in this section was collected on 7/11/17 (except where otherwise noted). HISTORY:   History of Present Injury/Illness (Reason for Referral): The patient comes to physical therapy clinic reporting lumbar pain on R side and R anterior thigh pain and numbness. Started 3 weeks ago for no known reason with no known injury or trauma. History of fall with lumbar spasms 1990's with full recovery and significant lumbar pain since then. Mittie Eduar of symptoms reported in lumbar as occasionally stabbing and dull ache. Symptoms are reported as getting better over the last 2 weeks. Average pain level reported as 8/10 and worst pain a 10/10. Aggravating factors include prolonged standing/walking, lifting/bending, laying on your back, negotiating stairs. Relieving factors include rest, sitting in a certain position, TENS, heat/ice. Imaging performed includes none at this point. Past Medical History/Comorbidities:   Ms. Valente Dc  has HTN and patellar fracture with repair a few years ago, past cancer medical history on file.   Social History/Living Environment:     Lives is a 2 story home with bed room upstairs  Prior Level of Function/Work/Activity:  Works as a  since [address] [place] [address] and has a mix of sitting and walking; wants to get back to dancing, bowling, shopping  Dominant Side:         RIGHT  Current Medications:     No current outpatient prescriptions on file. Date Last Reviewed:  8/22/2017    Number of Personal Factors/Comorbidities that affect the Plan of Care: 1-2: MODERATE COMPLEXITY   EXAMINATION:   Patient denies any increase of symptoms with cough, sneeze, or valsalva maneuver. Patient denies any saddle paresthesia or changes in bowel/bladder function. .............   Observation/Orthostatic:   In Standing    Thoracic Spine Slight kyphosis   Lumbar Spine Increased lordosis   Lumbar Soft Tissues Tight and guarded R   LE Positioning Decreased weight bearing on R LE   Gait Observation Guarded and slow gait   Palpation: tender, firm, guarded R paraspinals and quadratus lumborum   Lumbar Segmental Joint Mobility: unable to accurately test due to guarding  Range of Motion:    Lumbar AROM   Flexion 80% pulling lumbar   Extension 20% pain inc   R Rotation 60%   L Rotation 60%   R Sidebending 70%   L Sidebending 70%      Strength:   Lumbar    Flexion 4-/5   Extension 4   R Rotation 4-   L Rotation 4-   R Sidebending 4-   L Sidebending 4-   Lumbar Multifidus Activation    Transverse Abdominis(TrA) Activation       Special Tests:     Lumbar    Passive Straight Leg Raise (SLR) Negative for signs of HNP   Crossed SLR Negative     Neurologic Screen:    Myotomes Right  Left   Hip Flexion (L1-2) Normal Normal   Knee Extension (L3) Normal Normal   Ankle Dorsiflexion (L4) Normal Normal   Ankle Eversion or Great Toe Extension (L5) Normal Normal   Knee Flexion or Ankle Plantarflexion (S1) Normal Normal     Dermatomes Right  Left    Mid Thigh L1-2 Dull Normal   Medial Knee/Patella L3 Normal Normal   Medial Lower Leg/Foot L4 Normal Normal   Lateral Lower Leg/Foot L5 Normal Normal   Posterior Lower Leg S1 Normal Normal     Reflexes Right  Left   Patellar (L4) 2+ 2+   Achilles Tendon (S1-2) 2+ 2+     Neural Tension Tests Right  Left   Passive Straight Leg Raise (SLR) Abnormal neural tension Mild abnormal neural tension   Crossed SLR Negative Negative        Body Structures Involved:  1. Bones  2. Joints  3. Muscles  4. Ligaments Body Functions Affected:  1. Neuromusculoskeletal  2. Movement Related Activities and Participation Affected:  1. General Tasks and Demands  2. Mobility  3. Self Care  4. Domestic Life  5. Community, Social and [place]   Number of elements (examined above) that affect the Plan of Care: 4+: HIGH COMPLEXITY   CLINICAL PRESENTATION:   Presentation: Evolving clinical presentation with changing clinical characteristics: MODERATE COMPLEXITY   CLINICAL DECISION MAKING:   Outcome Measure: Tool Used: Modified Oswestry Low Back Pain Questionnaire  Score:  Initial: 31/50  Most Recent: X/50 (Date: -- )   Interpretation of Score: Each section is scored on a 0-5 scale, 5 representing the greatest disability. The scores of each section are added together for a total score of 50. Score 0 1-10 11-20 21-30 31-40 41-49 50   Modifier CH CI CJ CK CL CM CN     ? Mobility - Walking and Moving Around:     - CURRENT STATUS: CL - 60%-79% impaired, limited or restricted    - GOAL STATUS: CI - 1%-19% impaired, limited or restricted    - D/C STATUS:  ---------------To be determined---------------    Medical Necessity:   · Patient demonstrates good rehab potential due to higher previous functional level. Reason for Services/Other Comments:  · Patient continues to require skilled intervention due to continued functional limitations due to continued impairments. Use of outcome tool(s) and clinical judgement create a POC that gives a: Questionable prediction of patient's progress: MODERATE COMPLEXITY            TREATMENT:   (In addition to Assessment/Re-Assessment sessions the following treatments were rendered)  Pre-treatment Symptoms/Complaints:  Patient says her back is very sore today after having to go up/down stairs multiple times at work.      Pain: Initial: Pain Intensity 1: 8/10 Post Session: 4/10     Therapeutic Exercise: (30 Minutes):  Exercises per grid below to improve mobility, strength and coordination. Required minimal visual and verbal cues to promote proper body posture and promote proper body mechanics. Progressed exercises as indicated. **Allergic to Latex** Date:  8/17/17 Date:  8/22/17   Activity/Exercise Parameters Parameters   Lower Trunk Rotation (LTR) 2 min 2 min   Posterior Pelvic Tilt (PPT) 2 min 2 min   Sidelying Thoracic/Lumbar Rotation 2 x 10 --   Supine BKTC with swiss ball -- 2 min   Hip isometrics -- --   Glut set -- 1 min   KTC X 10 each x10 each   Self distraction -- --   Cat camel 2 x 10 --   Hand heel rocks x10 x10   Child pose 30s --   Cable step out 2 x 10 --   Flexion laying -- With TENS x 5 min      Manual Therapy (    Soft Tissue Mobilization Duration  Duration: 15 Minutes): Manual techniques to facilitate improved motion and decreased pain. · Soft Tissue Mobilization (STM)- R paraspinals and R quadratus lumborum  · Supine Hip Axial Distraction Oscillations for lumbar muscle relaxation    Treatment/Session Assessment:  Patient irritability level much higher today due to increased activity level at work but has favorable response to prolonged flexion positioning with TENS use during today's session. · Response to Treatment:  No irritation of any symptoms. · Compliance with Program/Exercises: Will assess as treatment progresses. · Recommendations/Intent for next treatment session: \"Next visit will focus on advancements to more challenging activities\". Total Treatment Duration: 45 minutes  PT Patient Time In/Time Out  Time In: 1625  Time Out: [address]  [place]

## 2017-08-29 ENCOUNTER — HOSPITAL ENCOUNTER (OUTPATIENT)
Dept: PHYSICAL THERAPY | Age: 67
Discharge: HOME | End: 2017-08-29
Payer: COMMERCIAL

## 2017-08-29 PROCEDURE — 97110 THERAPEUTIC EXERCISES: CPT

## 2017-08-29 NOTE — PROGRESS NOTES
Jennifer Munroe  : [facility] [address] [place] [address]  [phone]   [phone]        OUTPATIENT PHYSICAL THERAPY:Daily Note 2017    ICD-10: Treatment Diagnosis: Lumbar Pain- M54.5  Precautions/Allergies:   Codine, Latex   Fall Risk Score: 1 (? 5 = High Risk)  MD Orders: Eval and Treat MEDICAL/REFERRING DIAGNOSIS:  Low back pain [M54.5]   DATE OF ONSET: 3 weeks ago  REFERRING PHYSICIAN: Shahriar Dunaway MD  RETURN PHYSICIAN APPOINTMENT: Not Reported     INITIAL ASSESSMENT:  Ms. Link Aldrich presents with lumbar pain with facet joint irritation and associated muscle guarding. PROBLEM LIST (Impacting functional limitations):  1. Decreased Strength  2. Decreased ADL/Functional Activities  3. Decreased Ambulation Ability/Technique  4. Increased Pain  5. Decreased Activity Tolerance  6. Decreased Flexibility/Joint Mobility INTERVENTIONS PLANNED:  1. Heat  2. Home Exercise Program (HEP)  3. Manual Therapy  4. Neuromuscular Re-education/Strengthening  5. Range of Motion (ROM)  6. Therapeutic Exercise/Strengthening   TREATMENT PLAN:  Effective Dates: 17 TO 17. Frequency/Duration: 2 times a week for 8 weeks  GOALS: (Goals have been discussed and agreed upon with patient.)  SHORT-TERM FUNCTIONAL GOALS: Time Frame: 2017  1. The patient will be independent with a basic home exercise program(HEP) to address pain, ROM, and muscle function  2. The patient will show a decrease in pain level to <6/10. DISCHARGE GOALS: Time Frame: 2017  1. The patient will be independent with an advanced home exercise program(HEP) to address pain, ROM, and muscle function  2. The patient will show improvement of AROM to within functional limits to improve ability to bend, lift, perform work duties, and sleep normally.   3. The patient will show a return of lumbopelvic muscle strength to >4+/5 and improvements in muscle coordination, control, and conditioning for return to activities of bending, lifting, sleeping, prolonged sitting, prolonged walking, housework, and negotiating stairs. 4. The patient will show a decrease in pain level to <3/10 to improve ability to return to activities of bending, lifting, sleeping, prolonged sitting, prolonged walking, housework, and negotiating stairs. 5. The patient will improve Oswestry score to <10/50 to reflect an improvement in this patients self reported functional ability. Rehabilitation Potential For Stated Goals: Good              The information in this section was collected on 7/11/17 (except where otherwise noted). HISTORY:   History of Present Injury/Illness (Reason for Referral): The patient comes to physical therapy clinic reporting lumbar pain on R side and R anterior thigh pain and numbness. Started 3 weeks ago for no known reason with no known injury or trauma. History of fall with lumbar spasms 1990's with full recovery and significant lumbar pain since then. Bridgette Mullet of symptoms reported in lumbar as occasionally stabbing and dull ache. Symptoms are reported as getting better over the last 2 weeks. Average pain level reported as 8/10 and worst pain a 10/10. Aggravating factors include prolonged standing/walking, lifting/bending, laying on your back, negotiating stairs. Relieving factors include rest, sitting in a certain position, TENS, heat/ice. Imaging performed includes none at this point. Past Medical History/Comorbidities:   Ms. Ranjeet Pacheco  has HTN and patellar fracture with repair a few years ago, past cancer medical history on file.   Social History/Living Environment:     Lives is a 2 story home with bed room upstairs  Prior Level of Function/Work/Activity:  Works as a  since [address] [place] [address] and has a mix of sitting and walking; wants to get back to dancing, bowling, shopping  Dominant Side:         RIGHT  Current Medications:     No current outpatient prescriptions on file. Date Last Reviewed:  8/29/2017    Number of Personal Factors/Comorbidities that affect the Plan of Care: 1-2: MODERATE COMPLEXITY   EXAMINATION:   Patient denies any increase of symptoms with cough, sneeze, or valsalva maneuver. Patient denies any saddle paresthesia or changes in bowel/bladder function. .............   Observation/Orthostatic:   In Standing    Thoracic Spine Slight kyphosis   Lumbar Spine Increased lordosis   Lumbar Soft Tissues Tight and guarded R   LE Positioning Decreased weight bearing on R LE   Gait Observation Guarded and slow gait   Palpation: tender, firm, guarded R paraspinals and quadratus lumborum   Lumbar Segmental Joint Mobility: unable to accurately test due to guarding  Range of Motion:    Lumbar AROM   Flexion 80% pulling lumbar   Extension 20% pain inc   R Rotation 60%   L Rotation 60%   R Sidebending 70%   L Sidebending 70%      Strength:   Lumbar    Flexion 4-/5   Extension 4   R Rotation 4-   L Rotation 4-   R Sidebending 4-   L Sidebending 4-   Lumbar Multifidus Activation    Transverse Abdominis(TrA) Activation       Special Tests:     Lumbar    Passive Straight Leg Raise (SLR) Negative for signs of HNP   Crossed SLR Negative     Neurologic Screen:    Myotomes Right  Left   Hip Flexion (L1-2) Normal Normal   Knee Extension (L3) Normal Normal   Ankle Dorsiflexion (L4) Normal Normal   Ankle Eversion or Great Toe Extension (L5) Normal Normal   Knee Flexion or Ankle Plantarflexion (S1) Normal Normal     Dermatomes Right  Left    Mid Thigh L1-2 Dull Normal   Medial Knee/Patella L3 Normal Normal   Medial Lower Leg/Foot L4 Normal Normal   Lateral Lower Leg/Foot L5 Normal Normal   Posterior Lower Leg S1 Normal Normal     Reflexes Right  Left   Patellar (L4) 2+ 2+   Achilles Tendon (S1-2) 2+ 2+     Neural Tension Tests Right  Left   Passive Straight Leg Raise (SLR) Abnormal neural tension Mild abnormal neural tension   Crossed SLR Negative Negative        Body Structures Involved:  1. Bones  2. Joints  3. Muscles  4. Ligaments Body Functions Affected:  1. Neuromusculoskeletal  2. Movement Related Activities and Participation Affected:  1. General Tasks and Demands  2. Mobility  3. Self Care  4. Domestic Life  5. Community, Social and [place]   Number of elements (examined above) that affect the Plan of Care: 4+: HIGH COMPLEXITY   CLINICAL PRESENTATION:   Presentation: Evolving clinical presentation with changing clinical characteristics: MODERATE COMPLEXITY   CLINICAL DECISION MAKING:   Outcome Measure: Tool Used: Modified Oswestry Low Back Pain Questionnaire  Score:  Initial: 31/50  Most Recent: X/50 (Date: -- )   Interpretation of Score: Each section is scored on a 0-5 scale, 5 representing the greatest disability. The scores of each section are added together for a total score of 50. Score 0 1-10 11-20 21-30 31-40 41-49 50   Modifier CH CI CJ CK CL CM CN     ? Mobility - Walking and Moving Around:     - CURRENT STATUS: CL - 60%-79% impaired, limited or restricted    - GOAL STATUS: CI - 1%-19% impaired, limited or restricted    - D/C STATUS:  ---------------To be determined---------------    Medical Necessity:   · Patient demonstrates good rehab potential due to higher previous functional level. Reason for Services/Other Comments:  · Patient continues to require skilled intervention due to continued functional limitations due to continued impairments. Use of outcome tool(s) and clinical judgement create a POC that gives a: Questionable prediction of patient's progress: MODERATE COMPLEXITY            TREATMENT:   (In addition to Assessment/Re-Assessment sessions the following treatments were rendered)  Pre-treatment Symptoms/Complaints:  Patient says her back has felt much better over the past few days.       Pain: Initial: Pain Intensity 1: 0/10 Post Session: 2/10     Therapeutic Exercise: (30 Minutes):  Exercises per grid below to improve mobility, strength and coordination. Required minimal visual and verbal cues to promote proper body posture and promote proper body mechanics. Progressed exercises as indicated. **Allergic to Latex** Date:  8/24/17 Date:  8/29/17   Activity/Exercise Parameters Parameters   Lower Trunk Rotation (LTR) 2 min 2 min   Posterior Pelvic Tilt (PPT) 2 min 2 min   Glut set 1 min x 2 --   KTC SK and DK x 10 each X 10 each   Cat camel 2 x 10 --   Hand heel rocks x15 --   Child pose 30s x 3 --   SI belt wear 10 minutes Review x 2 min   Bridges -- 2 x 10   Clam shell -- Reg and rev 2 x 10    Band walks -- Side and monsters, 10 ft x 2 each   Sit to stand -- x10      Manual Therapy (     ): Manual techniques to facilitate improved motion and decreased pain. (not performed today)  · Soft Tissue Mobilization (STM)- R paraspinals and R quadratus lumborum  · Supine Hip Axial Distraction Oscillations for lumbar muscle relaxation    Treatment/Session Assessment:  Patient has excellent tolerance to more open and closed chain hip strength tasks today without complaints of any increasing low back pain. Will plan for discharge to independent [facility] later this week. · Response to Treatment:  No irritation of any symptoms. · Compliance with Program/Exercises: Will assess as treatment progresses. · Recommendations/Intent for next treatment session: \"Next visit will focus on advancements to more challenging activities\". Total Treatment Duration: 30 minutes  PT Patient Time In/Time Out  Time In: 1630  Time Out: [address] [facility]

## 2017-08-31 ENCOUNTER — HOSPITAL ENCOUNTER (OUTPATIENT)
Dept: PHYSICAL THERAPY | Age: 67
Discharge: HOME | End: 2017-08-31
Payer: COMMERCIAL

## 2017-08-31 PROCEDURE — 97110 THERAPEUTIC EXERCISES: CPT

## 2017-08-31 PROCEDURE — G8980 MOBILITY D/C STATUS: HCPCS

## 2017-08-31 PROCEDURE — G8979 MOBILITY GOAL STATUS: HCPCS

## 2017-08-31 NOTE — PROGRESS NOTES
Evert Sanchez  : [facility] [address] [place] [address]  [phone]   [phone]        OUTPATIENT PHYSICAL THERAPY:Daily Note and Discharge 2017    ICD-10: Treatment Diagnosis: Lumbar Pain- M54.5  Precautions/Allergies:   Codine, Latex   Fall Risk Score: 1 (? 5 = High Risk)  MD Orders: Eval and Treat MEDICAL/REFERRING DIAGNOSIS:  Low back pain [M54.5]   DATE OF ONSET: 3 weeks ago  REFERRING PHYSICIAN: Leo Velasquez MD  RETURN PHYSICIAN APPOINTMENT: Not Reported     INITIAL ASSESSMENT:  Ms. Ranjeet Pacheco presents with lumbar pain with facet joint irritation and associated muscle guarding. 17: Patient has made excellent progress over therapy course and will continue with independent HEP at this time. PROBLEM LIST (Impacting functional limitations):  1. Decreased Strength  2. Decreased ADL/Functional Activities  3. Decreased Ambulation Ability/Technique  4. Increased Pain  5. Decreased Activity Tolerance  6. Decreased Flexibility/Joint Mobility INTERVENTIONS PLANNED:  1. Heat  2. Home Exercise Program (HEP)  3. Manual Therapy  4. Neuromuscular Re-education/Strengthening  5. Range of Motion (ROM)  6. Therapeutic Exercise/Strengthening   TREATMENT PLAN:  Effective Dates: 17 TO 17. Frequency/Duration: 2 times a week for 8 weeks  GOALS: (Goals have been discussed and agreed upon with patient.)  SHORT-TERM FUNCTIONAL GOALS: Time Frame: 2017  1. The patient will be independent with a basic home exercise program(HEP) to address pain, ROM, and muscle function. MET  2. The patient will show a decrease in pain level to <6/10. MET  DISCHARGE GOALS: Time Frame: 2017  1. The patient will be independent with an advanced home exercise program(HEP) to address pain, ROM, and muscle function. MET  2.  The patient will show improvement of AROM to within functional limits to improve ability to bend, lift, perform work duties, and sleep normally. MET  3. The patient will show a return of lumbopelvic muscle strength to >4+/5 and improvements in muscle coordination, control, and conditioning for return to activities of bending, lifting, sleeping, prolonged sitting, prolonged walking, housework, and negotiating stairs. MET  4. The patient will show a decrease in pain level to <3/10 to improve ability to return to activities of bending, lifting, sleeping, prolonged sitting, prolonged walking, housework, and negotiating stairs. MET  5. The patient will improve Oswestry score to <10/50 to reflect an improvement in this patients self reported functional ability. MET    Rehabilitation Potential For Stated Goals: Good              The information in this section was collected on 8/31/17 (except where otherwise noted). HISTORY:   History of Present Injury/Illness (Reason for Referral): The patient comes to physical therapy clinic reporting lumbar pain on R side and R anterior thigh pain and numbness. Started 3 weeks ago for no known reason with no known injury or trauma. History of fall with lumbar spasms 1990's with full recovery and significant lumbar pain since then. Saima Jara of symptoms reported in lumbar as occasionally stabbing and dull ache. Symptoms are reported as getting better over the last 2 weeks. Average pain level reported as 8/10 and worst pain a 10/10. Aggravating factors include prolonged standing/walking, lifting/bending, laying on your back, negotiating stairs. Relieving factors include rest, sitting in a certain position, TENS, heat/ice. Imaging performed includes none at this point. Past Medical History/Comorbidities:   Ms. Melinda Jacobson  has HTN and patellar fracture with repair a few years ago, past cancer medical history on file.   Social History/Living Environment:     Lives is a 2 story home with bed room upstairs  Prior Level of Function/Work/Activity:  Works as a  since [address] [place] [address] and has a mix of sitting and walking; wants to get back to dancing, bowling, shopping  Dominant Side:         RIGHT  Current Medications:     No current outpatient prescriptions on file. Date Last Reviewed:  8/31/2017    Number of Personal Factors/Comorbidities that affect the Plan of Care: 1-2: MODERATE COMPLEXITY   EXAMINATION:   Patient denies any increase of symptoms with cough, sneeze, or valsalva maneuver. Patient denies any saddle paresthesia or changes in bowel/bladder function. .............   Observation/Orthostatic:   In Standing    Thoracic Spine Slight kyphosis   Lumbar Spine Increased lordosis   Lumbar Soft Tissues Tight and guarded R   LE Positioning Decreased weight bearing on R LE   Gait Observation Guarded and slow gait   Palpation: tender, firm, guarded R paraspinals and quadratus lumborum   Lumbar Segmental Joint Mobility: unable to accurately test due to guarding  Range of Motion:    Lumbar AROM   Flexion Normal   Extension  Normal   R Rotation Normal   L Rotation Normal   R Sidebending Normal   L Sidebending Normal      Strength:   Lumbar    Flexion 4+/5   Extension 4+/5   R Rotation 4+/5   L Rotation 4+/5   R Sidebending 4+/5   L Sidebending 4+/5   Lumbar Multifidus Activation 4+/5   Transverse Abdominis(TrA) Activation 4+/5      Special Tests:     Lumbar    Passive Straight Leg Raise (SLR) Negative for signs of HNP   Crossed SLR Negative     Neurologic Screen:    Myotomes Right  Left   Hip Flexion (L1-2) Normal Normal   Knee Extension (L3) Normal Normal   Ankle Dorsiflexion (L4) Normal Normal   Ankle Eversion or Great Toe Extension (L5) Normal Normal   Knee Flexion or Ankle Plantarflexion (S1) Normal Normal     Dermatomes Right  Left    Mid Thigh L1-2 Dull Normal   Medial Knee/Patella L3 Normal Normal   Medial Lower Leg/Foot L4 Normal Normal   Lateral Lower Leg/Foot L5 Normal Normal   Posterior Lower Leg S1 Normal Normal     Reflexes Right  Left   Patellar (L4) 2+ 2+   Achilles Tendon (S1-2) 2+ 2+ Neural Tension Tests Right  Left   Passive Straight Leg Raise (SLR) Abnormal neural tension Mild abnormal neural tension   Crossed SLR Negative Negative        Body Structures Involved:  1. Bones  2. Joints  3. Muscles  4. Ligaments Body Functions Affected:  1. Neuromusculoskeletal  2. Movement Related Activities and Participation Affected:  1. General Tasks and Demands  2. Mobility  3. Self Care  4. Domestic Life  5. Community, Social and [place]   Number of elements (examined above) that affect the Plan of Care: 4+: HIGH COMPLEXITY   CLINICAL PRESENTATION:   Presentation: Evolving clinical presentation with changing clinical characteristics: MODERATE COMPLEXITY   CLINICAL DECISION MAKING:   Outcome Measure: Tool Used: Modified Oswestry Low Back Pain Questionnaire  Score:  Initial: 31/50  Most Recent:10/50 (Date:8/31/17)   Interpretation of Score: Each section is scored on a 0-5 scale, 5 representing the greatest disability. The scores of each section are added together for a total score of 50. Score 0 1-10 11-20 21-30 31-40 41-49 50   Modifier CH CI CJ CK CL CM CN     ? Mobility - Walking and Moving Around:     - CURRENT STATUS: CL - 60%-79% impaired, limited or restricted    - GOAL STATUS: CI - 1%-19% impaired, limited or restricted    - D/C STATUS:  CI - 1%-19% impaired, limited or restricted    Medical Necessity:   · Patient demonstrates good rehab potential due to higher previous functional level. Reason for Services/Other Comments:  · Patient continues to require skilled intervention due to continued functional limitations due to continued impairments.    Use of outcome tool(s) and clinical judgement create a POC that gives a: Questionable prediction of patient's progress: MODERATE COMPLEXITY            TREATMENT:   (In addition to Assessment/Re-Assessment sessions the following treatments were rendered)  Pre-treatment Symptoms/Complaints:  Patient says her back has continued to feel ok this week. Pain: Initial: Pain Intensity 1: 0/10 Post Session:0/10     Therapeutic Exercise: (30 Minutes):  Exercises per grid below to improve mobility, strength and coordination. Required minimal visual and verbal cues to promote proper body posture and promote proper body mechanics. Progressed exercises as indicated. **Allergic to Latex** Date:  8/31/17   Activity/Exercise Parameters   Lower Trunk Rotation (LTR) 2 min   Posterior Pelvic Tilt (PPT) 2 min   Glut set --   KTC X 10 each   Bridges 2 x 10   Clam shell Reg and rev 2 x 10    Band walks Side and monsters, 10 ft x 2 each   Sit to stand x10      Manual Therapy (     ): Manual techniques to facilitate improved motion and decreased pain. (not performed today)  · Soft Tissue Mobilization (STM)- R paraspinals and R quadratus lumborum  · Supine Hip Axial Distraction Oscillations for lumbar muscle relaxation    Treatment/Session Assessment:  Patient has made great progress and will continue with progressive and independent HEP at this time. · Recommendations: Discharge from physical therapy. Total Treatment Duration: 30 minutes  PT Patient Time In/Time Out  Time In: 1630  Time Out: [facility]